# Patient Record
Sex: MALE | Race: WHITE | Employment: FULL TIME | URBAN - METROPOLITAN AREA
[De-identification: names, ages, dates, MRNs, and addresses within clinical notes are randomized per-mention and may not be internally consistent; named-entity substitution may affect disease eponyms.]

---

## 2017-01-12 DIAGNOSIS — I10 ESSENTIAL HYPERTENSION: Primary | ICD-10-CM

## 2017-01-12 RX ORDER — LISINOPRIL 10 MG/1
10 TABLET ORAL DAILY
Qty: 30 TABLET | Refills: 2 | Status: SHIPPED | OUTPATIENT
Start: 2017-01-12 | End: 2017-04-17

## 2017-01-16 ENCOUNTER — TELEPHONE (OUTPATIENT)
Dept: FAMILY MEDICINE CLINIC | Facility: CLINIC | Age: 52
End: 2017-01-16

## 2017-02-16 NOTE — TELEPHONE ENCOUNTER
LOV 10/20/2016           1/17/2017  Last labs- 6/30/2016   Please approve or deny Rx request.  Thank you!

## 2017-03-15 NOTE — TELEPHONE ENCOUNTER
LOV 10/20/16  LF 2/16/17  Labs were due in Jan.  I mailed pt a letter that he is due for labs and f/up

## 2017-04-06 DIAGNOSIS — M12.9 ARTHRITIS, MULTIPLE JOINT INVOLVEMENT: Primary | ICD-10-CM

## 2017-04-06 RX ORDER — MELOXICAM 15 MG/1
15 TABLET ORAL DAILY
Qty: 30 TABLET | Refills: 0 | Status: SHIPPED | OUTPATIENT
Start: 2017-04-06 | End: 2017-05-08

## 2017-04-17 DIAGNOSIS — I10 ESSENTIAL HYPERTENSION: Primary | ICD-10-CM

## 2017-04-17 RX ORDER — LISINOPRIL 10 MG/1
10 TABLET ORAL DAILY
Qty: 30 TABLET | Refills: 2 | Status: SHIPPED | OUTPATIENT
Start: 2017-04-17 | End: 2018-04-12

## 2017-05-08 DIAGNOSIS — M12.9 ARTHRITIS, MULTIPLE JOINT INVOLVEMENT: Primary | ICD-10-CM

## 2017-05-08 RX ORDER — MELOXICAM 15 MG/1
15 TABLET ORAL DAILY
Qty: 30 TABLET | Refills: 0 | Status: SHIPPED | OUTPATIENT
Start: 2017-05-08 | End: 2017-06-26

## 2017-05-08 NOTE — TELEPHONE ENCOUNTER
LOV 10/20/2016          LF 4/6/2017  -Pt due for office visit and labs   Please approve or deny Rx request.  Thank you!

## 2017-05-11 NOTE — TELEPHONE ENCOUNTER
LOV 10/20/2016           LF 4/20/2017  Pt due for office visit and labs, pt has no future appts scheduled    Please approve or deny Rx request.  Thank you!

## 2017-06-26 DIAGNOSIS — M12.9 ARTHRITIS, MULTIPLE JOINT INVOLVEMENT: ICD-10-CM

## 2017-06-26 RX ORDER — MELOXICAM 15 MG/1
15 TABLET ORAL DAILY
Qty: 30 TABLET | Refills: 0 | Status: SHIPPED | OUTPATIENT
Start: 2017-06-26 | End: 2020-12-16

## 2017-06-26 NOTE — TELEPHONE ENCOUNTER
LOV: 10/20/2016             LF: Meloxicam- 5/8/2017        Metformin- 5/11/2017    -Pt due for OV and labs, no future appointments have been made      Please approve or deny Rx request.  Thank you!

## 2017-09-15 DIAGNOSIS — R52 TOTAL BODY PAIN: ICD-10-CM

## 2017-09-15 DIAGNOSIS — F34.1 DYSTHYMIA: ICD-10-CM

## 2017-09-15 NOTE — TELEPHONE ENCOUNTER
LOV 10/20/2016           LF 8/11/2017     -No PCP listed    Please approve or deny Rx request.  Thank you!

## 2017-09-22 RX ORDER — DULOXETIN HYDROCHLORIDE 60 MG/1
60 CAPSULE, DELAYED RELEASE ORAL DAILY
Qty: 30 CAPSULE | Refills: 0 | Status: SHIPPED | OUTPATIENT
Start: 2017-09-22 | End: 2019-05-06

## 2017-12-06 DIAGNOSIS — R52 TOTAL BODY PAIN: ICD-10-CM

## 2017-12-06 DIAGNOSIS — F34.1 DYSTHYMIA: ICD-10-CM

## 2017-12-06 RX ORDER — DULOXETIN HYDROCHLORIDE 60 MG/1
60 CAPSULE, DELAYED RELEASE ORAL DAILY
Qty: 30 CAPSULE | Refills: 0 | OUTPATIENT
Start: 2017-12-06

## 2017-12-06 NOTE — TELEPHONE ENCOUNTER
Medication(s) to Refill:   Pending Prescriptions Disp Refills    DULOXETINE HCL 60 MG Oral Cap DR Particles [Pharmacy Med Name: DULOXETINE HCL DR 60 MG CAP] 30 capsule 0     Sig: TAKE 1 CAPSULE (60 MG TOTAL) BY MOUTH DAILY. Last Time Medication was Filled:  9/22/2017    Last Office Visit with PCP:  10/20/2016    When Patient was Due Back to the Office:  (from when PCP last addressed medication)  [] 1 month,  [] 3 months,  [] 6 months,  [] 12 months,  [] Other      No future appointments.      [] Prescription needs to be printed at site and faxed to pharmacy  []Controlled Substance, prescription needs to be printed at the site, site to notify patient when ready  Unable to Refill per Protocol:   [] Office visit due (90 day supply prescription extension has already been granted)   [] Blood Pressure out of range   [] Labs Overdue  []  Medication has never been prescribed by [facility] provider   [x] Other     Last Blood Pressures:  BP Readings from Last 2 Encounters:  10/20/16 : 128/80  09/29/16 : 126/80

## 2019-04-24 ENCOUNTER — HOSPITAL ENCOUNTER (EMERGENCY)
Age: 54
Discharge: HOME | End: 2019-04-24
Attending: EMERGENCY MEDICINE
Payer: COMMERCIAL

## 2019-04-24 VITALS
HEART RATE: 82 BPM | SYSTOLIC BLOOD PRESSURE: 156 MMHG | HEIGHT: 75 IN | BODY MASS INDEX: 37.3 KG/M2 | RESPIRATION RATE: 16 BRPM | TEMPERATURE: 98 F | WEIGHT: 300 LBS | OXYGEN SATURATION: 97 % | DIASTOLIC BLOOD PRESSURE: 90 MMHG

## 2019-04-24 DIAGNOSIS — E11.65 TYPE 2 DIABETES MELLITUS WITH HYPERGLYCEMIA, WITHOUT LONG-TERM CURRENT USE OF INSULIN (HCC): ICD-10-CM

## 2019-04-24 DIAGNOSIS — I10 ELEVATED BLOOD PRESSURE READING WITH DIAGNOSIS OF HYPERTENSION: Primary | ICD-10-CM

## 2019-04-24 PROCEDURE — 82962 GLUCOSE BLOOD TEST: CPT

## 2019-04-24 PROCEDURE — 99284 EMERGENCY DEPT VISIT MOD MDM: CPT

## 2019-04-24 PROCEDURE — 85025 COMPLETE CBC W/AUTO DIFF WBC: CPT | Performed by: EMERGENCY MEDICINE

## 2019-04-24 PROCEDURE — 82009 KETONE BODYS QUAL: CPT | Performed by: PHYSICIAN ASSISTANT

## 2019-04-24 PROCEDURE — 96361 HYDRATE IV INFUSION ADD-ON: CPT

## 2019-04-24 PROCEDURE — 80053 COMPREHEN METABOLIC PANEL: CPT | Performed by: EMERGENCY MEDICINE

## 2019-04-24 PROCEDURE — 96360 HYDRATION IV INFUSION INIT: CPT

## 2019-04-24 RX ORDER — AMLODIPINE BESYLATE 10 MG/1
10 TABLET ORAL DAILY
COMMUNITY
End: 2020-08-13

## 2019-04-24 NOTE — ED INITIAL ASSESSMENT (HPI)
C/o high blood sugar has been high for the past few months. Was 286- 3 hrs ago. No PCP. Will see new doctor on May 2nd On metformin. C/o nausea, no vomiting.

## 2019-04-24 NOTE — ED PROVIDER NOTES
Patient Seen in: [facility] Emergency Department In [place]    History   Patient presents with:  Hyperglycemia (metabolic)    Stated Complaint: blood sugar was 286 3 hrs ago, c/o body aches. HPI    81-year-old male here with complaint of hyperglycemia. Systems    Positive for stated complaint: blood sugar was 286 3 hrs ago, c/o body aches. Other systems are as noted in HPI. Constitutional and vital signs reviewed. All other systems reviewed and negative except as noted above.     Physical Exam 47.6 (*)     All other components within normal limits   ACETONE - Normal   CBC WITH DIFFERENTIAL WITH PLATELET    Narrative: The following orders were created for panel order CBC WITH DIFFERENTIAL WITH PLATELET.   Procedure

## 2019-04-25 ENCOUNTER — TELEPHONE (OUTPATIENT)
Dept: FAMILY MEDICINE CLINIC | Facility: CLINIC | Age: 54
End: 2019-04-25

## 2019-04-25 NOTE — TELEPHONE ENCOUNTER
Left patient a message in regards to scheduling an appointment after being seen at [facility]

## 2021-06-02 NOTE — ED PROVIDER NOTES
Patient Seen in: [facility] Emergency Department In [place]      History   Patient presents with:  Eval-D    Stated Complaint: opiate withdrawal    HPI/Subjective:   HPI    50-year-old male here for help with opiate use.   Patient states he has been taking 92   Temp 98 °F (36.7 °C) (Temporal)   Resp 18   Ht 188 cm (6' 2\")   Wt (!) 144.7 kg   SpO2 93%   BMI 40.96 kg/m²         Physical Exam    Alert and oriented ×3 in no acute distress. BMI is 40  HEENT exam within normal limits.   Neck supple without lympha

## 2021-06-02 NOTE — ED INITIAL ASSESSMENT (HPI)
PT to the ED for evaluation of withdrawal symptoms from norco. PT states he has been on norco for 9 years and ran out 4 days ago.

## 2021-06-02 NOTE — ED QUICK NOTES
Per Anshu Watkins at [facility], [address] insurance is within network. Since there are no safety concerns, Pt can be sent to [facility] for a fast pass via private car. Wife at the bedside. Comfortable with plan.

## (undated) NOTE — LETTER
Date & Time: 4/24/2019, 2:15 PM  Patient: Kristy Chery  Encounter Provider(s):    MD Claude Johnson PA       To Whom It May Concern:    Kae Ponce was seen and treated in our department on 4/24/2019.   PT will be returned

## (undated) NOTE — ED AVS SNAPSHOT
Alo Louie   MRN: [id number]    Department:  THE [facility] Emergency Department in [place]   Date of Visit:  4/24/2019           Disclosure     Insurance plans vary and the physician(s) referred by the ER may not be covered by your plan.  Please co tell this physician (or your personal doctor if your instructions are to return to your personal doctor) about any new or lasting problems. The primary care or specialist physician will see patients referred from the [facility] Emergency Department.  Chris Olivares